# Patient Record
Sex: FEMALE | Race: WHITE | URBAN - METROPOLITAN AREA
[De-identification: names, ages, dates, MRNs, and addresses within clinical notes are randomized per-mention and may not be internally consistent; named-entity substitution may affect disease eponyms.]

---

## 2023-06-09 ENCOUNTER — TESTING ONLY (OUTPATIENT)
Dept: URBAN - METROPOLITAN AREA CLINIC 17 | Facility: CLINIC | Age: 63
End: 2023-06-09

## 2023-06-09 DIAGNOSIS — H04.123 DRY EYE SYNDROME OF BILATERAL LACRIMAL GLANDS: ICD-10-CM

## 2023-06-09 DIAGNOSIS — H52.4 PRESBYOPIA: Primary | ICD-10-CM

## 2023-06-09 PROCEDURE — 92004 COMPRE OPH EXAM NEW PT 1/>: CPT

## 2023-06-09 PROCEDURE — 92310 CONTACT LENS FITTING OU: CPT

## 2023-06-09 ASSESSMENT — INTRAOCULAR PRESSURE
OD: 16
OS: 16

## 2023-06-09 ASSESSMENT — VISUAL ACUITY
OD: 20/20
OS: 20/20

## 2023-06-09 NOTE — IMPRESSION/PLAN
Impression: Presbyopia: H52.4.
-trial lenses ordered for patient Plan: Patient educated on all refractive findings. SRx was finalized and released to patient. Discussed adaptation period of at least 3 weeks of full time wear with patient. Continue to monitor.

## 2023-06-23 ENCOUNTER — TESTING ONLY (OUTPATIENT)
Dept: URBAN - METROPOLITAN AREA CLINIC 17 | Facility: CLINIC | Age: 63
End: 2023-06-23

## 2023-06-23 DIAGNOSIS — H52.4 PRESBYOPIA: Primary | ICD-10-CM

## 2023-06-23 PROCEDURE — 92310 CONTACT LENS FITTING OU: CPT

## 2023-06-23 NOTE — IMPRESSION/PLAN
Impression: Presbyopia: H52.4.
-trial lenses released to patient Plan: Patient educated on all refractive findings. SRx was finalized and released to patient. Discussed adaptation period of at least 3 weeks of full time wear with patient. Continue to monitor.

## 2023-07-07 ENCOUNTER — OFFICE VISIT (OUTPATIENT)
Dept: URBAN - METROPOLITAN AREA CLINIC 17 | Facility: CLINIC | Age: 63
End: 2023-07-07

## 2023-07-07 DIAGNOSIS — H52.4 PRESBYOPIA: Primary | ICD-10-CM

## 2023-07-07 PROCEDURE — 92310 CONTACT LENS FITTING OU: CPT

## 2023-07-07 NOTE — IMPRESSION/PLAN
Impression: Presbyopia: H52.4. Plan: Patient educated on all refractive findings. SRx and CLRx were finalized and released to patient. Discussed adaptation period of at least 3 weeks of full time wear with patient. Further discussed good contact lens hygiene with patient. Continue to monitor.